# Patient Record
Sex: FEMALE | Race: WHITE | NOT HISPANIC OR LATINO | Employment: STUDENT | URBAN - METROPOLITAN AREA
[De-identification: names, ages, dates, MRNs, and addresses within clinical notes are randomized per-mention and may not be internally consistent; named-entity substitution may affect disease eponyms.]

---

## 2017-11-20 ENCOUNTER — OFFICE VISIT (OUTPATIENT)
Dept: PHYSICAL MEDICINE AND REHAB | Facility: CLINIC | Age: 22
End: 2017-11-20
Payer: COMMERCIAL

## 2017-11-20 ENCOUNTER — HOSPITAL ENCOUNTER (OUTPATIENT)
Dept: RADIOLOGY | Facility: HOSPITAL | Age: 22
Discharge: HOME | End: 2017-11-20
Attending: PEDIATRICS
Payer: COMMERCIAL

## 2017-11-20 VITALS
WEIGHT: 138 LBS | DIASTOLIC BLOOD PRESSURE: 79 MMHG | SYSTOLIC BLOOD PRESSURE: 124 MMHG | HEIGHT: 64 IN | HEART RATE: 84 BPM | BODY MASS INDEX: 23.56 KG/M2

## 2017-11-20 DIAGNOSIS — G80.8 CONGENITAL DIPLEGIA: ICD-10-CM

## 2017-11-20 DIAGNOSIS — M21.70 LEG LENGTH DISCREPANCY: ICD-10-CM

## 2017-11-20 DIAGNOSIS — M41.45 NEUROMUSCULAR SCOLIOSIS OF THORACOLUMBAR REGION: ICD-10-CM

## 2017-11-20 DIAGNOSIS — G80.8 CONGENITAL DIPLEGIA: Primary | ICD-10-CM

## 2017-11-20 DIAGNOSIS — R26.9 GAIT ABNORMALITY: ICD-10-CM

## 2017-11-20 DIAGNOSIS — F82 FINE MOTOR DELAY: ICD-10-CM

## 2017-11-20 PROCEDURE — 99999 PR PBB SHADOW E&M-NEW PATIENT-LVL III: CPT | Mod: PBBFAC,,, | Performed by: PEDIATRICS

## 2017-11-20 PROCEDURE — 72082 X-RAY EXAM ENTIRE SPI 2/3 VW: CPT | Mod: TC,PO

## 2017-11-20 PROCEDURE — 77073 BONE LENGTH STUDIES: CPT | Mod: 26,,, | Performed by: RADIOLOGY

## 2017-11-20 PROCEDURE — 77073 BONE LENGTH STUDIES: CPT | Mod: TC,PO

## 2017-11-20 PROCEDURE — 72082 X-RAY EXAM ENTIRE SPI 2/3 VW: CPT | Mod: 26,,, | Performed by: RADIOLOGY

## 2017-11-20 PROCEDURE — 99205 OFFICE O/P NEW HI 60 MIN: CPT | Mod: S$GLB,,, | Performed by: PEDIATRICS

## 2017-11-20 RX ORDER — DIVALPROEX SODIUM 250 MG/1
250 TABLET, DELAYED RELEASE ORAL 3 TIMES DAILY
Refills: 3 | COMMUNITY
Start: 2017-10-20

## 2017-11-20 RX ORDER — LEVOTHYROXINE SODIUM 50 UG/1
50 TABLET ORAL DAILY
Refills: 0 | COMMUNITY
Start: 2017-10-24

## 2017-11-20 RX ORDER — GLUCOSAM/CHON-MSM1/C/MANG/BOSW 500-416.6
TABLET ORAL
Refills: 3 | COMMUNITY
Start: 2017-10-05

## 2017-11-20 RX ORDER — GLUCAGON 1 MG
VIAL (EA) INJECTION
Refills: 0 | COMMUNITY
Start: 2017-10-04

## 2017-11-20 RX ORDER — HYDROXYZINE PAMOATE 50 MG/1
CAPSULE ORAL
Refills: 3 | COMMUNITY
Start: 2017-10-20

## 2017-11-20 RX ORDER — NEOMYCIN SULFATE, POLYMYXIN B SULFATE AND DEXAMETHASONE 3.5; 10000; 1 MG/ML; [USP'U]/ML; MG/ML
SUSPENSION/ DROPS OPHTHALMIC
Refills: 0 | COMMUNITY
Start: 2017-11-06

## 2017-11-20 RX ORDER — FOLIC ACID 1 MG/1
1000 TABLET ORAL 4 TIMES DAILY
Refills: 3 | COMMUNITY
Start: 2017-10-20

## 2017-11-20 NOTE — PROGRESS NOTES
"[facility]ISIT      CHIEF COMPLAINT:    1. Spastic diplegia.     HISTORY OF PRESENT ILLNESS: Shonda is a 21-year-old female with a history of congenital spastic diplegia and autism spectrum disorder who presents in f/u regarding spasticity management. She was last seen over 3 year ago on 9/30/13 and is here today with her family.      Zuly mother's primary concerns are: (1) Shonda has outgrown her AFO's; (2) She is exhibiting increased overpronation of her feet with increased tightness of the heel cords; and (3) she is exhibiting increased falls with ambulation. She has been recently followed by an ortho in [place], [place] who has rec'd achilles tendon lengthening surgery but the family is interested in an additional opinion.      In terms of Shonda's current functional history, she and stands independently. She can walk approximately 30 yards before complaining of leg pain. She is tripping/falling more frequently than her same aged peers and frequently catches her toes. She is independent for all transfers. She can walk up and down stairs, needs to use railing. She is active at home and will feed her chickens and a pot bellied pig which she cares for.     In terms of activities of daily living, she requires moderate assist for bathing, grooming, self-cares, and dressing. She can taker/put her clothes on and off. She can brush her teeth, but relies on mother to help. She uses utensils to eat, fork and spoon, no knife. She drinks from open cup. She can use large snaps and buttons but not zippers. She wears diapers, and has improved control of bladder/bowels, with infrequent accidents. In terms of communication and cognition, his guardians estimate that he has "too   many words to count." She talks in full sentences. She knows body parts, colors, letters and numbers. She can read/write at a 4-7 yo level.      In terms of current therapeutic interventions, Shonda is " doing an HEP/HSP with her mother. She does ROM exercises, heel cord stretching, fine motor training. No outpatient PT/OT/SpeechT.  She has a personal care assistant for 12 hours daily. Has homebound schooling by her mother. She has APE and Speech 1/wk each. No recreational or complimentary alternative interventions to this point. In terms of adaptive equipment and assistive devices at the home, Shonda has a pair of bilateral solid ankle SMOs with inserts that are worn all day and generally upwards of 10-12 hours. These are outgrown now x 2 years and no longer worn as a result. She previously used bilateral Ultraflex ADF bracing O/N x 3-4 hours but has not in over 2 years. . They were originally Rxd to correct ankle pronation and to increase weight bearing.      GESTATIONAL HISTORY: Shonda was born prematurely between 32 weeks   gestation. She is a twin. Birth weight was 3 lbs. She did not spend any time in     Intensive Care Unit. She did not required oxygen supplementation.   In terms of Zuly developmental history, she first sat at 3 year. She first stood at 6 years. She first cruised at 6 years. First walked at 6 years. She said her first words at 4 years. She first put 2 word sentences at 6 years.      PAST MEDICAL HISTORY:   1. PCP: Followed by Dr. Michael Luna  2. Neurology: Followed by Dr. Adam Jorge  3. Psychiatry: Followed by Dr. Luis Medina.   4. Orthopedics: Followed by Dr. Wiggisn at [facility], last seen 3 years ago.     CP- spastic diplegia  Autism spectrum disorder  Epilepsy  Microcephaly  Tib/Fib fx Bilateral  R arm distal fx  Femoral torsion  Scoliosis  Torticolis     PAST SURGICAL HISTORY:   1. Tonsillectomy     FAMILY HISTORY: Positive for Multiple Sclerosis in mother.     SOCIAL HISTORY: The patient lives with her biological family.      MEDICATIONS:   Depakote 250 mg tid  Vistaril 25 mg prn  Levothyroxine 25 mcg QD  Risperidone 1 mg Q AM, 1.5 mg QHS  Clonopine  BID     ALLERGIES: No known drug allergies.      REVIEW OF SYSTEMS: Positive constipation. No dysphagia. No weight, appetite or sleep concerns. Positive behavior concerns. No skin lesions.      PHYSICAL EXAMINATION:   VITALS: Reviewed  GENERAL: The patient is awake, alert, cooperative, smiling, playful and in no   acute distress.   HEENT: Normocephalic, atraumatic. Pupils are equal, round and reactive to   light bilaterally. Tracking is in all 4 quadrants. No facial asymmetry. Uvula   is midline.   NECK: Supple. No lymphadenopathy. No masses. Full range of motion. No   torticollis.   HEART: Regular rate and rhythm. No murmurs, rubs or gallops.   LUNGS: Clear to auscultation bilaterally. No crackles, rhonchi or wheezes.   ABDOMEN: Benign.   EXTREMITIES: Warm, capillary refill less than 2 seconds. No clubbing, cyanosis   or edema.   MUSCULOSKELETAL: No focal muscular/limb atrophy/hypertrophy. No leg length   discrepancy. Negative Galeazzi sign bilaterally. No gross deformity. Right varus heel. + mild TL scoliosis.Thigh-foot angles are 0 degrees bilsterally. LL's are measured from the ASIS to the med malleolus as 88cm on the right and 89.5 on the left.   NEUROMUSCULAR: Passive range of motion throughout both upper extremities is   within functional limits and without asymmetry. In the lower extremities, passive range of motion is within functional limits and without asymmetry with the exception of: Hip int/ext rotation to 55 degrees/65 degrees respectively, bilaterally.   Popliteal angles to 20 degrees on Right and 20 degrees on Left.   Ankle dorsiflexion to +5 degrees bilaterally. Cranial nerves II-XII are grossly intact by   observation. I do not appreciate spasticity on exam nor hypotonicity. . Manual muscle testing was   unable to be performed secondary to reduced level of compliance of patient. Cerebellar testing was unable to be performed for the same reason. No dyskinetic or dystonic movements appreciated. There  is symmetric withdraw to stimulus in all 4 extremities. Muscle stretch reflexes are 2+ throughout both upper and lower extremities. No clonus  elicited at either ankle. Toes are downgoing bilaterally  GAIT/DYNAMIC: The patient ambulated independently up and down the hallway 4 times without falling. There is initial heel strike transitiooning to foot flat and then toe off. No crouch. The right foot/ankle are supinated excessively during both swing phanse and stnace phase.         ASSESSMENT: Shonda is a 21-year-old female with a history of autisim and congenital diplegia. She is seen by myself for the first time today. The following recommendations and plan were discussed in depth with her mother and family who voiced understanding and were in agreement.      PLAN:   1. Spasticity: None appreciated at this time will cont to follow.   2. Bracing:  Rx for new bilateral custom molded SMOs  To be given with possible lift on the right. Will await results of scanagram for this determination. .   3. Therapy: New Rx's for outpatient PT and OT to be done 1/week.  Patient to work on increased independence of ADLs. Cont HEP/HSP as well.   4. Equipment: No current needs. We will continue to follow this closely over time.  5. Scoliosis and scanagram xrays to be performed today.   6. Education: No current issues. Continue home school program  7. I would like to have Shonda return to clinic in 3 months' time for re-evaluation.      Total time spent with the patient was 60 minutes with greater than 50% of time spent in counseling.

## 2017-11-27 DIAGNOSIS — G80.8 CONGENITAL DIPLEGIA: Primary | ICD-10-CM

## 2017-11-27 DIAGNOSIS — G80.8 CONGENITAL DIPLEGIA: ICD-10-CM

## 2018-01-22 ENCOUNTER — TELEPHONE (OUTPATIENT)
Dept: PHYSICAL MEDICINE AND REHAB | Facility: CLINIC | Age: 23
End: 2018-01-22

## 2018-01-22 NOTE — TELEPHONE ENCOUNTER
RN returned call to [employer]. [employer] states they received one form that was faxed today but they are missing a second. RN asked her to fax the second since we do not have it here in clinic and they

## 2018-01-22 NOTE — TELEPHONE ENCOUNTER
----- Message from Olya Reyna sent at 1/22/2018  1:59 PM CST -----  Medical for necessity for ankle brac.  Please fax into [facility] at [phone].  Any questions [employer] at [phone]

## 2018-01-23 ENCOUNTER — TELEPHONE (OUTPATIENT)
Dept: PHYSICAL MEDICINE AND REHAB | Facility: CLINIC | Age: 23
End: 2018-01-23

## 2018-01-23 NOTE — TELEPHONE ENCOUNTER
----- Message from Adelaide Davison sent at 1/23/2018  9:13 AM CST -----  [facility] needs office notes faxed to [phone] so can submit to insurance for authorization/if any questions call back at [phone].

## 2018-02-01 ENCOUNTER — TELEPHONE (OUTPATIENT)
Dept: PHYSICAL MEDICINE AND REHAB | Facility: CLINIC | Age: 23
End: 2018-02-01

## 2018-02-01 NOTE — TELEPHONE ENCOUNTER
----- Message from Diomedes Liz sent at 2/1/2018  9:46 AM CST -----  Contact: [facility] - Liliya [phone]  The office requesting patient's clinical notes.. Thanks!

## 2018-02-01 NOTE — TELEPHONE ENCOUNTER
RN called Liliya and let her know she will inform Dr. Hilliard notes are needed for approval for Braces.

## 2018-02-06 ENCOUNTER — TELEPHONE (OUTPATIENT)
Dept: PHYSICAL MEDICINE AND REHAB | Facility: CLINIC | Age: 23
End: 2018-02-06

## 2018-02-06 NOTE — TELEPHONE ENCOUNTER
----- Message from Randee Mckeon sent at 2/6/2018 10:49 AM CST -----  Contact:  Gisselle De La Cruz (Mother) [phone]   Gisselle De La Cruz (Mother) [phone] please call   Need notes to get PA to

## 2018-03-16 ENCOUNTER — TELEPHONE (OUTPATIENT)
Dept: PHYSICAL MEDICINE AND REHAB | Facility: CLINIC | Age: 23
End: 2018-03-16

## 2018-03-16 NOTE — TELEPHONE ENCOUNTER
RN returned call to mom.  LM letting mom know that MD is booked out for a few weeks after Monday so please return call if she would like us to try to get Shonda back on in same spot before another patient takes time slot.    Thank you so much.    Mayra

## 2018-03-16 NOTE — TELEPHONE ENCOUNTER
----- Message from RT Darvin sent at 3/16/2018  9:06 AM CDT -----  Contact: Abena,Mother,[phone]   Abena,Mother,[phone], requesting a call back soon, she would like to see if pt can be worked in at a time and date that is more convenient for the pt, thanks.

## 2020-01-21 ENCOUNTER — HOSPITAL ENCOUNTER (OUTPATIENT)
Dept: TELEMEDICINE | Facility: HOSPITAL | Age: 25
Discharge: HOME | End: 2020-01-21
Payer: MEDICAID

## 2020-01-21 PROCEDURE — 99282 EMERGENCY DEPT VISIT SF MDM: CPT | Mod: 95,,, | Performed by: PSYCHIATRY & NEUROLOGY

## 2020-01-21 PROCEDURE — 99282 PR EMERGENCY DEPT VISIT,LEVEL II: ICD-10-PCS | Mod: 95,,, | Performed by: PSYCHIATRY & NEUROLOGY
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
